# Patient Record
Sex: FEMALE | Race: WHITE | NOT HISPANIC OR LATINO | Employment: FULL TIME | URBAN - METROPOLITAN AREA
[De-identification: names, ages, dates, MRNs, and addresses within clinical notes are randomized per-mention and may not be internally consistent; named-entity substitution may affect disease eponyms.]

---

## 2018-09-05 ENCOUNTER — OFFICE VISIT (OUTPATIENT)
Dept: URGENT CARE | Facility: CLINIC | Age: 23
End: 2018-09-05

## 2018-09-05 VITALS
HEART RATE: 63 BPM | RESPIRATION RATE: 16 BRPM | DIASTOLIC BLOOD PRESSURE: 68 MMHG | BODY MASS INDEX: 43.19 KG/M2 | WEIGHT: 220 LBS | SYSTOLIC BLOOD PRESSURE: 116 MMHG | TEMPERATURE: 97.6 F | HEIGHT: 60 IN | OXYGEN SATURATION: 100 %

## 2018-09-05 DIAGNOSIS — J02.9 EXUDATIVE PHARYNGITIS: Primary | ICD-10-CM

## 2018-09-05 DIAGNOSIS — E66.01 MORBID OBESITY WITH BMI OF 40.0-44.9, ADULT (HCC): ICD-10-CM

## 2018-09-05 LAB
INT CON NEG: NORMAL
INT CON POS: NORMAL
S PYO AG THROAT QL: NORMAL

## 2018-09-05 PROCEDURE — 99204 OFFICE O/P NEW MOD 45 MIN: CPT | Performed by: PHYSICIAN ASSISTANT

## 2018-09-05 PROCEDURE — 87880 STREP A ASSAY W/OPTIC: CPT | Performed by: PHYSICIAN ASSISTANT

## 2018-09-05 RX ORDER — CEFDINIR 300 MG/1
300 CAPSULE ORAL 2 TIMES DAILY
Qty: 14 CAP | Refills: 0 | Status: SHIPPED | OUTPATIENT
Start: 2018-09-05 | End: 2018-09-12

## 2018-09-05 NOTE — LETTER
September 5, 2018       Patient: Chary De La Rosa   YOB: 1995   Date of Visit: 9/5/2018         To Whom It May Concern:    It is my medical opinion that Chary De La Rosa may be excused from work for the dates of 9/5/18-9/6/18.      If you have any questions or concerns, please don't hesitate to call [phone]          Sincerely,          Yoav Adan P.A.-C.  Electronically Signed

## 2018-09-06 PROBLEM — E66.01 MORBID OBESITY WITH BMI OF 40.0-44.9, ADULT (HCC): Status: ACTIVE | Noted: 2018-09-06

## 2018-09-06 NOTE — PROGRESS NOTES
Subjective:      Pt is a 22 y.o. female who presents with Pharyngitis (x2 days )            HPI  PT presents to  clinic today complaining of sore throat, fevers, chills, body aches, fatigue, runny nose. PT denies CP, SOB, NVD, abdominal pain, joint pain. PT states these symptoms began around 2 days ago. PT states the pain is a 6-7/10 with swallowing, aching in nature and worse at night.  Pt has not taken any RX medications for this condition. The pt's medication list, problem list, and allergies have been evaluated and reviewed during today's visit.      PMH:  Negative per pt.      PSH:  Negative per pt.      Fam Hx:  the patient's family history is not pertinent to their current complaint    Soc HX:  Social History     Social History   • Marital status: Single     Spouse name: N/A   • Number of children: N/A   • Years of education: N/A     Occupational History   • Not on file.     Social History Main Topics   • Smoking status: Never Smoker   • Smokeless tobacco: Never Used   • Alcohol use No   • Drug use: No   • Sexual activity: Not on file     Other Topics Concern   • Not on file     Social History Narrative   • No narrative on file         Medications:    Current Outpatient Prescriptions:   •  cefdinir (OMNICEF) 300 MG Cap, Take 1 Cap by mouth 2 times a day for 7 days., Disp: 14 Cap, Rfl: 0  •  Alum & Mag Hydroxide-Simeth (MAALOX PLUS-BENADRYL-XYLOCAINE), Take 15 mL by mouth every 6 hours as needed for up to 5 days., Disp: 300 mL, Rfl: 0      Allergies:  Patient has no known allergies.    ROS  Constitutional: Positive for body aches and malaise/fatigue.   HENT: Positive for congestion and sore throat. Negative for ear pain.    Eyes: Negative for blurred vision, double vision and photophobia.   Respiratory: Positive for cough and sputum production. Negative for hemoptysis, shortness of breath and wheezing.    Cardiovascular: Negative for chest pain and palpitations.   Gastrointestinal: Negative for nausea,  vomiting, abdominal pain, diarrhea and constipation.   Genitourinary: Negative for dysuria and flank pain.   Musculoskeletal: Negative for falls and myalgias.   Skin: Negative for itching and rash.   Neurological: Positive for headaches. Negative for dizziness and tingling.   Endo/Heme/Allergies: Does not bruise/bleed easily.   Psychiatric/Behavioral: Negative for depression. The patient is not nervous/anxious.             Objective:     /68   Pulse 63   Temp 36.4 °C (97.6 °F)   Resp 16   Ht 1.524 m (5')   Wt 99.8 kg (220 lb)   SpO2 100%   BMI 42.97 kg/m²      Physical Exam       Constitutional: PT is oriented to person, place, and time. PT appears well-developed and well-nourished. No distress.   HENT:   Head: Normocephalic and atraumatic.   Right Ear: Hearing, tympanic membrane, external ear and ear canal normal.   Left Ear: Hearing, tympanic membrane, external ear and ear canal normal.   Nose: Mucosal edema, rhinorrhea and sinus tenderness present. Right sinus exhibits frontal sinus tenderness. Left sinus exhibits frontal sinus tenderness.   Mouth/Throat: Uvula is midline. Mucous membranes are pale. Posterior oropharyngeal edema and posterior oropharyngeal erythema with exudate noted on exam.   Eyes: Conjunctivae normal and EOM are normal. Pupils are equal, round, and reactive to light.   Neck: Normal range of motion. Neck supple. No thyromegaly present.   Cardiovascular: Normal rate, regular rhythm, normal heart sounds and intact distal pulses.  Exam reveals no gallop and no friction rub.    No murmur heard.  Pulmonary/Chest: Effort normal and breath sounds normal. No respiratory distress. PT has no wheezes. PT has no rales. PT exhibits no tenderness.   Abdominal: Soft. Bowel sounds are normal. PT exhibits no distension and no mass. There is no tenderness. There is no rebound and no guarding.   Musculoskeletal: Normal range of motion. PT exhibits no edema and no tenderness.   Lymphadenopathy:     PT  has no cervical adenopathy.   Neurological: PT is alert and oriented to person, place, and time. PT displays normal reflexes. No cranial nerve deficit. PT exhibits normal muscle tone. Coordination normal.   Skin: Skin is warm and dry. No rash noted. No erythema.   Psychiatric: PT has a normal mood and affect. PT behavior is normal. Judgment and thought content normal.        Assessment/Plan:     1. Exudative pharyngitis  Back-up abx if symptoms do not improve in 2-3 days. PT on clinical presentation has exudate on oropharynx and it's possible beyond the strep A testing that this could be a different type of strep (C/G) or A. haemolyticum     - POCT Rapid Strep A-->NEG  - cefdinir (OMNICEF) 300 MG Cap; Take 1 Cap by mouth 2 times a day for 7 days.  Dispense: 14 Cap; Refill: 0  - Alum & Mag Hydroxide-Simeth (MAALOX PLUS-BENADRYL-XYLOCAINE); Take 15 mL by mouth every 6 hours as needed for up to 5 days.  Dispense: 300 mL; Refill: 0    2. Morbid obesity with BMI of 40.0-44.9, adult (HCC)    - Patient identified as having weight management issue.  Appropriate orders and counseling given.    Rest, fluids encouraged.  OTC decongestant for congestion  Note given for work.  AVS with medical info given.  Pt was in full understanding and agreement with the plan.  Follow-up as needed if symptoms worsen or fail to improve.

## 2019-05-17 ENCOUNTER — EMPLOYEE HEALTH (OUTPATIENT)
Dept: OCCUPATIONAL MEDICINE | Facility: CLINIC | Age: 24
End: 2019-05-17

## 2019-05-17 ENCOUNTER — EH NON-PROVIDER (OUTPATIENT)
Dept: OCCUPATIONAL MEDICINE | Facility: CLINIC | Age: 24
End: 2019-05-17

## 2019-05-17 ENCOUNTER — HOSPITAL ENCOUNTER (OUTPATIENT)
Facility: MEDICAL CENTER | Age: 24
End: 2019-05-17
Attending: PREVENTIVE MEDICINE
Payer: COMMERCIAL

## 2019-05-17 VITALS
HEIGHT: 61 IN | BODY MASS INDEX: 39.65 KG/M2 | TEMPERATURE: 98.9 F | DIASTOLIC BLOOD PRESSURE: 72 MMHG | WEIGHT: 210 LBS | SYSTOLIC BLOOD PRESSURE: 114 MMHG | HEART RATE: 86 BPM | RESPIRATION RATE: 14 BRPM | OXYGEN SATURATION: 97 %

## 2019-05-17 DIAGNOSIS — Z02.1 ENCOUNTER FOR PRE-EMPLOYMENT HEALTH SCREENING EXAMINATION: ICD-10-CM

## 2019-05-17 DIAGNOSIS — Z02.1 PRE-EMPLOYMENT DRUG SCREENING: ICD-10-CM

## 2019-05-17 LAB
AMP AMPHETAMINE: NORMAL
BAR BARBITURATES: NORMAL
BZO BENZODIAZEPINES: NORMAL
COC COCAINE: NORMAL
INT CON NEG: NORMAL
INT CON POS: NORMAL
MDMA ECSTASY: NORMAL
MET METHAMPHETAMINES: NORMAL
MTD METHADONE: NORMAL
OPI OPIATES: NORMAL
OXY OXYCODONE: NORMAL
PCP PHENCYCLIDINE: NORMAL
POC URINE DRUG SCREEN OCDRS: NORMAL
THC: NORMAL

## 2019-05-17 PROCEDURE — 90715 TDAP VACCINE 7 YRS/> IM: CPT | Performed by: PREVENTIVE MEDICINE

## 2019-05-17 PROCEDURE — 94375 RESPIRATORY FLOW VOLUME LOOP: CPT | Performed by: PREVENTIVE MEDICINE

## 2019-05-17 PROCEDURE — 8915 PR COMPREHENSIVE PHYSICAL: Performed by: PREVENTIVE MEDICINE

## 2019-05-17 PROCEDURE — 80305 DRUG TEST PRSMV DIR OPT OBS: CPT | Performed by: PREVENTIVE MEDICINE

## 2019-05-17 PROCEDURE — 86480 TB TEST CELL IMMUN MEASURE: CPT | Performed by: PREVENTIVE MEDICINE

## 2019-05-17 RX ORDER — ETONOGESTREL AND ETHINYL ESTRADIOL VAGINAL RING .015; .12 MG/D; MG/D
RING VAGINAL
COMMUNITY
Start: 2016-09-01 | End: 2020-09-10

## 2019-05-17 RX ORDER — MELOXICAM 15 MG/1
15 TABLET ORAL
COMMUNITY
Start: 2016-06-14 | End: 2020-09-10

## 2019-05-17 RX ORDER — BENZONATATE 100 MG/1
CAPSULE ORAL
Refills: 1 | COMMUNITY
Start: 2019-05-14 | End: 2020-09-10

## 2019-05-20 LAB
GAMMA INTERFERON BACKGROUND BLD IA-ACNC: 0.03 IU/ML
M TB IFN-G BLD-IMP: NEGATIVE
M TB IFN-G CD4+ BCKGRND COR BLD-ACNC: 0.01 IU/ML
MITOGEN IGNF BCKGRD COR BLD-ACNC: >10 IU/ML
QFT TB2 - NIL TBQ2: 0.01 IU/ML

## 2019-05-22 ENCOUNTER — EH NON-PROVIDER (OUTPATIENT)
Dept: OCCUPATIONAL MEDICINE | Facility: CLINIC | Age: 24
End: 2019-05-22

## 2019-05-22 DIAGNOSIS — Z71.85 IMMUNIZATION COUNSELING: ICD-10-CM

## 2019-08-20 ENCOUNTER — HOSPITAL ENCOUNTER (OUTPATIENT)
Dept: LAB | Facility: MEDICAL CENTER | Age: 24
End: 2019-08-20
Payer: COMMERCIAL

## 2019-08-20 LAB
BDY FAT % MEASURED: 42.5 %
BP DIAS: 90 MMHG
BP SYS: 123 MMHG
CHOLEST SERPL-MCNC: 197 MG/DL (ref 100–199)
DIABETES HTDIA: NO
EVENT NAME HTEVT: NORMAL
FASTING HTFAS: YES
GLUCOSE SERPL-MCNC: 75 MG/DL (ref 65–99)
HDLC SERPL-MCNC: 34 MG/DL
HYPERTENSION HTHYP: NO
LDLC SERPL CALC-MCNC: 139 MG/DL
SCREENING LOC CITY HTCIT: NORMAL
SCREENING LOC STATE HTSTA: NORMAL
SCREENING LOCATION HTLOC: NORMAL
SMOKING HTSMO: NO
SUBSCRIBER ID HTSID: NORMAL
TRIGL SERPL-MCNC: 121 MG/DL (ref 0–149)

## 2019-09-24 ENCOUNTER — IMMUNIZATION (OUTPATIENT)
Dept: OCCUPATIONAL MEDICINE | Facility: CLINIC | Age: 24
End: 2019-09-24

## 2019-09-24 DIAGNOSIS — Z23 NEED FOR VACCINATION: ICD-10-CM

## 2019-09-25 PROCEDURE — 90686 IIV4 VACC NO PRSV 0.5 ML IM: CPT | Performed by: PREVENTIVE MEDICINE

## 2019-10-02 ENCOUNTER — DOCUMENTATION (OUTPATIENT)
Dept: OCCUPATIONAL MEDICINE | Facility: CLINIC | Age: 24
End: 2019-10-02

## 2019-10-15 ENCOUNTER — EH NON-PROVIDER (OUTPATIENT)
Dept: OCCUPATIONAL MEDICINE | Facility: CLINIC | Age: 24
End: 2019-10-15

## 2019-10-15 DIAGNOSIS — Z02.89 ENCOUNTER FOR OCCUPATIONAL HEALTH EXAMINATION INVOLVING RESPIRATOR: ICD-10-CM

## 2019-10-15 PROCEDURE — 94375 RESPIRATORY FLOW VOLUME LOOP: CPT | Performed by: NURSE PRACTITIONER

## 2020-03-09 ENCOUNTER — TELEPHONE (OUTPATIENT)
Dept: HEALTH INFORMATION MANAGEMENT | Facility: OTHER | Age: 25
End: 2020-03-09

## 2020-03-09 NOTE — TELEPHONE ENCOUNTER
1. Caller Name: Chary                        Call Back Number: [phone]  [facility] PCP or Specialty Provider: Dominick Webb        2.  Does patient have any active symptoms of respiratory illness (fever OR cough OR shortness of breath)? Yes, the patient reports the following respiratory symptoms: cough.    3.  In the last 30 days, has the patient traveled outside of the country OR in a high risk area within the [place] OR have any known contact with someone who has?  No.    4.  Does patient have any comoribidities? None     5. Disposition:  Advised to perform self care, monitor for worsening symptoms and to call back in 3 days if no improvement.    Note routed to PCP: TRACIE only. Pt has had a cough 3 days.  No other symptoms.  Pt wanted to have visit with PCP because she hasn't been seen for awhile.  Discussed with patient to recover from cold and then set up appt via [employer] to have visit with PCP.

## 2020-09-09 ENCOUNTER — TELEPHONE (OUTPATIENT)
Dept: SCHEDULING | Facility: IMAGING CENTER | Age: 25
End: 2020-09-09

## 2020-09-10 ENCOUNTER — OFFICE VISIT (OUTPATIENT)
Dept: MEDICAL GROUP | Facility: MEDICAL CENTER | Age: 25
End: 2020-09-10
Payer: COMMERCIAL

## 2020-09-10 VITALS
OXYGEN SATURATION: 96 % | TEMPERATURE: 97.2 F | WEIGHT: 176 LBS | DIASTOLIC BLOOD PRESSURE: 76 MMHG | HEART RATE: 64 BPM | RESPIRATION RATE: 16 BRPM | SYSTOLIC BLOOD PRESSURE: 118 MMHG | HEIGHT: 60 IN | BODY MASS INDEX: 34.55 KG/M2

## 2020-09-10 DIAGNOSIS — Z82.49 FAMILY HISTORY OF HEART DISEASE: ICD-10-CM

## 2020-09-10 DIAGNOSIS — N92.6 IRREGULAR PERIODS: ICD-10-CM

## 2020-09-10 DIAGNOSIS — Z13.6 SCREENING FOR CARDIOVASCULAR CONDITION: ICD-10-CM

## 2020-09-10 DIAGNOSIS — E28.2 PCOS (POLYCYSTIC OVARIAN SYNDROME): ICD-10-CM

## 2020-09-10 DIAGNOSIS — F32.0 CURRENT MILD EPISODE OF MAJOR DEPRESSIVE DISORDER WITHOUT PRIOR EPISODE (HCC): ICD-10-CM

## 2020-09-10 DIAGNOSIS — F41.1 GAD (GENERALIZED ANXIETY DISORDER): ICD-10-CM

## 2020-09-10 DIAGNOSIS — Z30.011 ENCOUNTER FOR INITIAL PRESCRIPTION OF CONTRACEPTIVE PILLS: ICD-10-CM

## 2020-09-10 DIAGNOSIS — E66.9 OBESITY (BMI 30-39.9): ICD-10-CM

## 2020-09-10 DIAGNOSIS — F41.0 PANIC ATTACKS: ICD-10-CM

## 2020-09-10 PROBLEM — E66.01 MORBID OBESITY WITH BMI OF 40.0-44.9, ADULT (HCC): Status: RESOLVED | Noted: 2018-09-06 | Resolved: 2020-09-10

## 2020-09-10 PROCEDURE — 99214 OFFICE O/P EST MOD 30 MIN: CPT | Performed by: FAMILY MEDICINE

## 2020-09-10 RX ORDER — SERTRALINE HYDROCHLORIDE 100 MG/1
100 TABLET, FILM COATED ORAL DAILY
Qty: 90 TAB | Refills: 0 | Status: SHIPPED | OUTPATIENT
Start: 2020-09-10 | End: 2020-11-16 | Stop reason: SDUPTHER

## 2020-09-10 RX ORDER — ETONOGESTREL AND ETHINYL ESTRADIOL VAGINAL RING .015; .12 MG/D; MG/D
1 RING VAGINAL
Qty: 9 EACH | Refills: 3 | Status: SHIPPED | OUTPATIENT
Start: 2020-09-10 | End: 2020-09-10 | Stop reason: CLARIF

## 2020-09-10 RX ORDER — PROPRANOLOL HYDROCHLORIDE 20 MG/1
20 TABLET ORAL 3 TIMES DAILY PRN
Qty: 90 TAB | Refills: 0 | Status: SHIPPED | OUTPATIENT
Start: 2020-09-10 | End: 2020-11-16 | Stop reason: SDUPTHER

## 2020-09-10 RX ORDER — NORGESTIMATE AND ETHINYL ESTRADIOL 0.25-0.035
1 KIT ORAL DAILY
Qty: 90 TAB | Refills: 3 | Status: SHIPPED | OUTPATIENT
Start: 2020-09-10 | End: 2020-11-16 | Stop reason: SDUPTHER

## 2020-09-10 ASSESSMENT — PATIENT HEALTH QUESTIONNAIRE - PHQ9
SUM OF ALL RESPONSES TO PHQ QUESTIONS 1-9: 4
CLINICAL INTERPRETATION OF PHQ2 SCORE: 1
5. POOR APPETITE OR OVEREATING: 1 - SEVERAL DAYS

## 2020-09-10 NOTE — PROGRESS NOTES
cc: Anxiety    Subjective:     Chary De La Rosa is a 24 y.o. female presenting to [facility] care:    1.  Anxiety, depression: Has been having anxiety and depression symptoms for the past several weeks, seems to be getting worse.  She will often cry for no particular reason, will feel sad most days, will want to isolate herself, has been somewhat debilitating, has felt like she has been unable to work.  Has low energy, poor appetite, poor concentration, guilt.  Denies any anhedonia, suicidal thoughts.  She is constantly anxious, is irritable, constantly worries.  She has panic attacks, feels like her heart races at times, no particular triggers.  She denies any hallucinations, manic episodes.  No significant life stressors.  She reports her mom has a history of depression and anxiety, is not sure what medications she is on.  She has an appointment for a telephone visit with a counselor next week.  She would like to start a medication.  PHQ 9: 10, STEPHANIE 7: 15    2.  Irregular periods: Has a history of irregular periods for years.  Associated with increased facial hair.  She was on the NuvaRing for birth control, but has not been on it recently.  She does get headaches, but denies any history of migraines with auras, DVTs, cancers.  LMP: currently on her period    Review of systems:  See above.  All other systems are reviewed and are negative.      Current Outpatient Medications:   •  sertraline (ZOLOFT) 100 MG Tab, Take 1 Tab by mouth every day., Disp: 90 Tab, Rfl: 0  •  norgestimate-ethinyl estradiol (ORTHO-CYCLEN) 0.25-35 MG-MCG per tablet, Take 1 Tab by mouth every day., Disp: 90 Tab, Rfl: 3  •  propranolol (INDERAL) 20 MG Tab, Take 1 Tab by mouth 3 times a day as needed (panic attacks)., Disp: 90 Tab, Rfl: 0    Allergies, past medical history, past surgical history, family history, social history reviewed and updated    Objective:     Vitals: /76   Pulse 64   Temp 36.2 °C (97.2 °F)   Resp 16   Ht  1.524 m (5')   Wt 79.8 kg (176 lb)   SpO2 96%   BMI 34.37 kg/m²   General: Alert, pleasant, NAD  HEENT: Normocephalic.  EOMI, no icterus or pallor.  Conjunctivae and lids normal. External ears normal. Oropharynx non-erythematous, mucous membranes moist.  Neck supple.  No thyromegaly or masses palpated. No cervical or supraclavicular lymphadenopathy.  Heart: Regular rate and rhythm.  S1 and S2 normal.  No murmurs appreciated.  Respiratory: Normal respiratory effort.  Clear to auscultation bilaterally.  Abdomen: Non-distended, soft  Skin: Warm, dry, no rashes.  Musculoskeletal: Gait is normal.  Moves all extremities well.  Extremities: No leg edema.  Psych:  Affect/mood is normal, judgement is good, memory is intact, grooming is appropriate.    Assessment/Plan:     Chary was seen today for [facility] care.    Diagnoses and all orders for this visit:    STEPHANIE (generalized anxiety disorder)  Panic attacks  Current mild episode of major depressive disorder without prior episode (HCC)  New problem.  We discussed her symptoms in detail, management.  Encouraged her to follow through with her appointment with therapy.  We will also start sertraline 50 mg daily for 1 to 2 weeks, then increase to 100 mg daily.  She can also try propranolol as needed for panic attacks.  She will let us know on mychart if she has any side effects or would like to switch to a different medication.  Follow-up in a month  -     sertraline (ZOLOFT) 100 MG Tab; Take 1 Tab by mouth every day.  -     propranolol (INDERAL) 20 MG Tab; Take 1 Tab by mouth 3 times a day as needed (panic attacks).    Irregular periods  PCOS (polycystic ovarian syndrome)  New problem, we discussed likely PCOS.  We discussed management, birth control pill.  She is willing to start this.  She is currently on her period.  -     CBC WITHOUT DIFFERENTIAL; Future  -     Comp Metabolic Panel; Future  -     TSH WITH REFLEX TO FT4; Future  -     PROLACTIN; Future    Encounter for  initial prescription of contraceptive pills        -     norgestimate-ethinyl estradiol (ORTHO-CYCLEN) 0.25-35 MG-MCG per tablet; Take 1 Tab by mouth every day.    Screening for cardiovascular condition  -     Lipid Profile; Future    Family history of (early) heart disease  Noted, will check a lipid panel    Obesity (BMI 30-39.9)  -     Patient identified as having weight management issue.  Appropriate orders and counseling given.      Return in about 4 weeks (around 10/8/2020) for Med check.

## 2020-09-17 ENCOUNTER — PATIENT MESSAGE (OUTPATIENT)
Dept: MEDICAL GROUP | Facility: MEDICAL CENTER | Age: 25
End: 2020-09-17

## 2020-09-17 DIAGNOSIS — F41.1 GAD (GENERALIZED ANXIETY DISORDER): ICD-10-CM

## 2020-09-17 DIAGNOSIS — F41.0 PANIC ATTACKS: ICD-10-CM

## 2020-09-17 DIAGNOSIS — F32.0 CURRENT MILD EPISODE OF MAJOR DEPRESSIVE DISORDER WITHOUT PRIOR EPISODE (HCC): ICD-10-CM

## 2020-09-18 NOTE — TELEPHONE ENCOUNTER
From: Chary De La Rosa  To: Calos Jefferson M.D.  Sent: 9/17/2020 6:01 PM PDT  Subject: Non-Urgent Medical Question    Hi there,     I had an appointment with Dr. Jefferson on Thursday, 9/10/2020. When I spoke with her, she mentioned that she could get me in touch with someone such as a psychologist/psychiatrist if I needed. At this point in time I would like to get a referral from her to speak with someone, as I feel the other resources I had did not work out.     Also, I have [facility] paperwork that I need signed by her, but do not know the best way to get the information to her. I can email the information if need be. If you have any questions you can also reach me at my cell phone [phone] or my email ([email or web address])    Thank you for your help.

## 2020-09-22 ENCOUNTER — OFFICE VISIT (OUTPATIENT)
Dept: MEDICAL GROUP | Facility: MEDICAL CENTER | Age: 25
End: 2020-09-22
Payer: COMMERCIAL

## 2020-09-22 VITALS
OXYGEN SATURATION: 96 % | WEIGHT: 179 LBS | HEART RATE: 64 BPM | TEMPERATURE: 97.6 F | BODY MASS INDEX: 35.14 KG/M2 | HEIGHT: 60 IN | DIASTOLIC BLOOD PRESSURE: 76 MMHG | SYSTOLIC BLOOD PRESSURE: 124 MMHG | RESPIRATION RATE: 16 BRPM

## 2020-09-22 DIAGNOSIS — F32.0 CURRENT MILD EPISODE OF MAJOR DEPRESSIVE DISORDER WITHOUT PRIOR EPISODE (HCC): ICD-10-CM

## 2020-09-22 DIAGNOSIS — F41.1 GAD (GENERALIZED ANXIETY DISORDER): ICD-10-CM

## 2020-09-22 DIAGNOSIS — F41.0 PANIC ATTACKS: ICD-10-CM

## 2020-09-22 DIAGNOSIS — Z23 NEED FOR VACCINATION: ICD-10-CM

## 2020-09-22 PROCEDURE — 90471 IMMUNIZATION ADMIN: CPT | Performed by: FAMILY MEDICINE

## 2020-09-22 PROCEDURE — 90686 IIV4 VACC NO PRSV 0.5 ML IM: CPT | Performed by: FAMILY MEDICINE

## 2020-09-22 PROCEDURE — 99213 OFFICE O/P EST LOW 20 MIN: CPT | Mod: 25 | Performed by: FAMILY MEDICINE

## 2020-09-22 NOTE — PROGRESS NOTES
cc: Anxiety    Subjective:     Chary De La Rosa is a 24 y.o. female presenting for follow-up of her anxiety and depression, to fill out paperwork.  Since her last visit, her symptoms have been mostly stable.  She has started the sertraline, is up to 100 mg daily.  She has taken the propranolol as needed, does find it helpful.  Denies any side effects.  She brings in [facility] paperwork to fill out.  She feels like she needs a continuous period of time off because of her symptoms as we are adjusting her medications.  She has a referral to psychiatry and psychology pending.    Review of systems:  See above.       Current Outpatient Medications:   •  sertraline (ZOLOFT) 100 MG Tab, Take 1 Tab by mouth every day., Disp: 90 Tab, Rfl: 0  •  norgestimate-ethinyl estradiol (ORTHO-CYCLEN) 0.25-35 MG-MCG per tablet, Take 1 Tab by mouth every day., Disp: 90 Tab, Rfl: 3  •  propranolol (INDERAL) 20 MG Tab, Take 1 Tab by mouth 3 times a day as needed (panic attacks)., Disp: 90 Tab, Rfl: 0    Allergies, past medical history, past surgical history, family history, social history reviewed and updated    Objective:     Vitals: /76   Pulse 64   Temp 36.4 °C (97.6 °F)   Resp 16   Ht 1.524 m (5')   Wt 81.2 kg (179 lb)   SpO2 96%   BMI 34.96 kg/m²   General: Alert, pleasant, NAD  HEENT: Normocephalic.    Psych:  Affect/mood is normal, judgement is good, memory is intact, grooming is appropriate.    Assessment/Plan:     Chary was seen today for paperwork and referral needed.    Diagnoses and all orders for this visit:    STEPHANIE (generalized anxiety disorder)  Panic attacks  Current mild episode of major depressive disorder without prior episode (HCC)  Stable, continue current medications.  Still somewhat too early to know if the medications are going to be effective for her.  Follow-up in a month.  Paperwork filled out, see media    Need for vaccination  -     Influenza Vaccine Quad Injection (PF)          Return in about 4  weeks (around 10/20/2020) for Med check.

## 2020-11-16 ENCOUNTER — OFFICE VISIT (OUTPATIENT)
Dept: MEDICAL GROUP | Facility: MEDICAL CENTER | Age: 25
End: 2020-11-16
Payer: COMMERCIAL

## 2020-11-16 DIAGNOSIS — F32.0 CURRENT MILD EPISODE OF MAJOR DEPRESSIVE DISORDER WITHOUT PRIOR EPISODE (HCC): ICD-10-CM

## 2020-11-16 DIAGNOSIS — F41.1 GAD (GENERALIZED ANXIETY DISORDER): ICD-10-CM

## 2020-11-16 DIAGNOSIS — F41.0 PANIC ATTACKS: ICD-10-CM

## 2020-11-16 PROCEDURE — 99213 OFFICE O/P EST LOW 20 MIN: CPT | Performed by: FAMILY MEDICINE

## 2020-11-16 RX ORDER — SERTRALINE HYDROCHLORIDE 100 MG/1
200 TABLET, FILM COATED ORAL DAILY
Qty: 180 TAB | Refills: 1 | Status: SHIPPED | OUTPATIENT
Start: 2020-11-16

## 2020-11-16 RX ORDER — NORGESTIMATE AND ETHINYL ESTRADIOL 0.25-0.035
1 KIT ORAL DAILY
Qty: 84 TAB | Refills: 3 | Status: SHIPPED | OUTPATIENT
Start: 2020-11-16

## 2020-11-16 RX ORDER — PROPRANOLOL HYDROCHLORIDE 40 MG/1
40 TABLET ORAL 3 TIMES DAILY PRN
Qty: 90 TAB | Refills: 1 | Status: SHIPPED | OUTPATIENT
Start: 2020-11-16

## 2020-11-16 NOTE — PROGRESS NOTES
cc:  anxiety    Subjective:     Chary De La Rosa is a 24 y.o. female presenting for a follow up of anxiety and depression.  Since her last visit, she has noticed improvement in her mood and anxiety.  However, she continues to have intermittent low moods, some anhedonia, some low energy.  She also does continue to feel anxious, usually at work.  She denies any side effects from the medications.  She has tried the propranolol, which is somewhat helpful.  PHQ9: 4; GAD7: 3  (was 10 and 15 prior)      Review of systems:  See above.       Current Outpatient Medications:   •  sertraline (ZOLOFT) 100 MG Tab, Take 2 Tabs by mouth every day., Disp: 180 Tab, Rfl: 1  •  propranolol (INDERAL) 40 MG Tab, Take 1 Tab by mouth 3 times a day as needed (panic attacks)., Disp: 90 Tab, Rfl: 1  •  norgestimate-ethinyl estradiol (ORTHO-CYCLEN) 0.25-35 MG-MCG per tablet, Take 1 Tab by mouth every day., Disp: 84 Tab, Rfl: 3    Allergies, past medical history, past surgical history, family history, social history reviewed and updated    Objective:     General: Alert, pleasant, NAD  HEENT: Normocephalic.   Skin: Warm, dry, no rashes.  Musculoskeletal: Gait is normal.  Moves all extremities well.  Extremities: No leg edema.  Psych:  Affect/mood is normal, judgement is good, memory is intact, grooming is appropriate.    Assessment/Plan:     Diagnoses and all orders for this visit:    STEPHANIE (generalized anxiety disorder)  Current mild episode of major depressive disorder without prior episode (HCC)  Panic attacks  Improving.  We discussed her medications.  She felt comfortable increasing the dose, will try sertraline at 200 mg daily and propranolol 40 mg as needed.  She also plans to schedule with psychology soon.  The earliest available appointment was in March, so she plans to try a different provider  -     sertraline (ZOLOFT) 100 MG Tab; Take 2 Tabs by mouth every day.  -     propranolol (INDERAL) 40 MG Tab; Take 1 Tab by mouth 3 times a  day as needed (panic attacks).    Other orders  -     norgestimate-ethinyl estradiol (ORTHO-CYCLEN) 0.25-35 MG-MCG per tablet; Take 1 Tab by mouth every day.          Return in about 3 months (around 2/16/2021) for routine follow up.

## 2020-12-16 DIAGNOSIS — Z23 NEED FOR VACCINATION: ICD-10-CM

## 2021-01-04 ENCOUNTER — PATIENT MESSAGE (OUTPATIENT)
Dept: MEDICAL GROUP | Facility: MEDICAL CENTER | Age: 26
End: 2021-01-04

## 2021-01-04 DIAGNOSIS — R05.9 COUGH: ICD-10-CM

## 2021-01-04 DIAGNOSIS — R53.83 FATIGUE, UNSPECIFIED TYPE: ICD-10-CM

## 2021-01-04 DIAGNOSIS — R09.81 NASAL CONGESTION: ICD-10-CM

## 2021-01-05 ENCOUNTER — HOSPITAL ENCOUNTER (OUTPATIENT)
Dept: LAB | Facility: MEDICAL CENTER | Age: 26
End: 2021-01-05
Attending: EMERGENCY MEDICINE
Payer: COMMERCIAL

## 2021-01-05 LAB
COVID ORDER STATUS COVID19: NORMAL
SARS-COV-2 RNA RESP QL NAA+PROBE: DETECTED
SPECIMEN SOURCE: ABNORMAL

## 2021-02-19 ENCOUNTER — APPOINTMENT (OUTPATIENT)
Dept: MEDICAL GROUP | Facility: MEDICAL CENTER | Age: 26
End: 2021-02-19
Payer: COMMERCIAL

## 2023-10-09 ENCOUNTER — DOCUMENTATION (OUTPATIENT)
Dept: HEALTH INFORMATION MANAGEMENT | Facility: OTHER | Age: 28
End: 2023-10-09
Payer: COMMERCIAL